# Patient Record
Sex: FEMALE | Race: BLACK OR AFRICAN AMERICAN | NOT HISPANIC OR LATINO | Employment: OTHER | URBAN - METROPOLITAN AREA
[De-identification: names, ages, dates, MRNs, and addresses within clinical notes are randomized per-mention and may not be internally consistent; named-entity substitution may affect disease eponyms.]

---

## 2019-02-28 ENCOUNTER — HOSPITAL ENCOUNTER (EMERGENCY)
Facility: HOSPITAL | Age: 44
Discharge: HOME | End: 2019-02-28
Attending: EMERGENCY MEDICINE
Payer: MEDICAID

## 2019-02-28 VITALS
TEMPERATURE: 99 F | WEIGHT: 166 LBS | HEIGHT: 65 IN | HEART RATE: 110 BPM | SYSTOLIC BLOOD PRESSURE: 122 MMHG | OXYGEN SATURATION: 97 % | DIASTOLIC BLOOD PRESSURE: 55 MMHG | BODY MASS INDEX: 27.66 KG/M2 | RESPIRATION RATE: 20 BRPM

## 2019-02-28 DIAGNOSIS — R68.89 FLU-LIKE SYMPTOMS: Primary | ICD-10-CM

## 2019-02-28 LAB
B-HCG UR QL: NEGATIVE
CTP QC/QA: YES
CTP QC/QA: YES
FLUAV AG NPH QL: NEGATIVE
FLUBV AG NPH QL: NEGATIVE
POCT GLUCOSE: 117 MG/DL (ref 70–110)

## 2019-02-28 PROCEDURE — 63600175 PHARM REV CODE 636 W HCPCS: Performed by: PHYSICIAN ASSISTANT

## 2019-02-28 PROCEDURE — 82962 GLUCOSE BLOOD TEST: CPT

## 2019-02-28 PROCEDURE — 96372 THER/PROPH/DIAG INJ SC/IM: CPT

## 2019-02-28 PROCEDURE — 99284 EMERGENCY DEPT VISIT MOD MDM: CPT | Mod: 25

## 2019-02-28 PROCEDURE — 25000003 PHARM REV CODE 250: Performed by: PHYSICIAN ASSISTANT

## 2019-02-28 PROCEDURE — 81025 URINE PREGNANCY TEST: CPT | Performed by: PHYSICIAN ASSISTANT

## 2019-02-28 RX ORDER — ONDANSETRON 4 MG/1
4 TABLET, ORALLY DISINTEGRATING ORAL
Status: COMPLETED | OUTPATIENT
Start: 2019-02-28 | End: 2019-02-28

## 2019-02-28 RX ORDER — BENZONATATE 100 MG/1
100 CAPSULE ORAL 3 TIMES DAILY PRN
Qty: 15 CAPSULE | Refills: 0 | Status: SHIPPED | OUTPATIENT
Start: 2019-02-28 | End: 2019-03-10

## 2019-02-28 RX ORDER — ONDANSETRON 4 MG/1
4 TABLET, FILM COATED ORAL EVERY 8 HOURS PRN
Qty: 12 TABLET | Refills: 0 | Status: SHIPPED | OUTPATIENT
Start: 2019-02-28

## 2019-02-28 RX ORDER — ACETAMINOPHEN 500 MG
1000 TABLET ORAL
Status: COMPLETED | OUTPATIENT
Start: 2019-02-28 | End: 2019-02-28

## 2019-02-28 RX ORDER — OSELTAMIVIR PHOSPHATE 75 MG/1
75 CAPSULE ORAL
COMMUNITY

## 2019-02-28 RX ORDER — KETOROLAC TROMETHAMINE 30 MG/ML
15 INJECTION, SOLUTION INTRAMUSCULAR; INTRAVENOUS
Status: COMPLETED | OUTPATIENT
Start: 2019-02-28 | End: 2019-02-28

## 2019-02-28 RX ADMIN — ONDANSETRON 4 MG: 4 TABLET, ORALLY DISINTEGRATING ORAL at 08:02

## 2019-02-28 RX ADMIN — KETOROLAC TROMETHAMINE 15 MG: 30 INJECTION, SOLUTION INTRAMUSCULAR at 09:02

## 2019-02-28 RX ADMIN — ACETAMINOPHEN 1000 MG: 500 TABLET, FILM COATED ORAL at 08:02

## 2019-03-01 NOTE — ED TRIAGE NOTES
"Pt c/o nonproductive cough, generalized body aches, fever, chills, N/V, sore throat since last night. Also reports "My stomach is sore from coughing". Pain is 10/10. Pt took robitussin and ibuprofen at 1200. Pt took osteltamivir earlier tonight but vomited it back up (first attempted dose)  "

## 2019-03-01 NOTE — ED PROVIDER NOTES
"Encounter Date: 2019  SORT:   42 y/o female with history of HTN, DM presenting for evaluation of 1 day history myalgias, vomiting x2, chills, dry cough, HA. Abdominal pain/soreness only with coughing. Denies fever,   diarrhea, urinary symptoms, nasal congestion, rhinorrhea. Sick contacts diagnosed with flu. Took 1 Tamiflu but vomited after. Initial orders placed. KASH Hermosillo PA-C       History     Chief Complaint   Patient presents with    Fatigue     denies fever; reports n/v, chills, and generalized body aches since yesterday; reports "I think I have the flu"; has also been coughing     Chief Complaint:  Flu-like symptoms  History of  Present Illness: History obtained from patient. This 43 y.o. female who has past medical history of gestational diabetes and hypertension presents to the ED complaining of flu-like symptoms that began last night.  She reports associated subjective fever, chills, decreased appetite, sore throat, nonproductive cough, fatigue and generalized body aches.  She reports multiple sick contacts with who have been diagnosed with flu.  She states she was prescribed Tamiflu and took the 1st dose today.  However, after taking the 1st does she began feeling nauseated and vomited twice.  She reports generalized abdominal pain that she describes as a sharp sensation only when she coughs.  She denies diarrhea, chest pain, shortness of breath, congestion, rhinorrhea, urinary symptoms, dizziness.          Review of patient's allergies indicates:  No Known Allergies  Past Medical History:   Diagnosis Date    Diabetes mellitus     gestational, diet controlled    Herpes     Hypertension      Past Surgical History:   Procedure Laterality Date     SECTION      x3     SECTION N/A 2014    [facility] at [facility]    DELIVERY-CEASAREAN SECTION Bilateral 2015    Performed by Shobha Cohen MD at [facility] L[facility]D OR    UMBILICAL HERNIA REPAIR   "     Family History   Problem Relation Age of Onset    Diabetes Other     Hypertension Other     Breast cancer Maternal Aunt     Breast cancer Paternal Aunt     Breast cancer Paternal Aunt     Colon cancer Neg Hx     Ovarian cancer Neg Hx      Social History     Tobacco Use    Smoking status: Never Smoker    Smokeless tobacco: Never Used   Substance Use Topics    Alcohol use: No    Drug use: No     Review of Systems   Constitutional: Positive for appetite change, chills, fatigue and fever.   HENT: Positive for sore throat. Negative for congestion and rhinorrhea.    Eyes: Negative for pain.   Respiratory: Positive for cough. Negative for shortness of breath.    Cardiovascular: Negative for chest pain.   Gastrointestinal: Positive for nausea and vomiting. Negative for abdominal pain and diarrhea.   Genitourinary: Negative for dysuria, frequency and hematuria.   Musculoskeletal: Negative for back pain.   Skin: Negative for rash.   Neurological: Negative for dizziness, weakness and headaches.       Physical Exam     Initial Vitals [02/28/19 2019]   BP Pulse Resp Temp SpO2   (!) 117/56 (!) 117 18 100.1 °F (37.8 °C) 99 %      MAP       --         Physical Exam    Nursing note and vitals reviewed.  Constitutional: She appears well-developed and well-nourished.  Non-toxic appearance. She does not have a sickly appearance. She does not appear ill. No distress.   HENT:   Head: Normocephalic and atraumatic.   Right Ear: Tympanic membrane normal.   Left Ear: Tympanic membrane normal.   Nose: Nose normal.   Mouth/Throat: Uvula is midline and mucous membranes are normal. Posterior oropharyngeal erythema present.   Eyes: EOM are normal. Pupils are equal, round, and reactive to light.   Neck: Normal range of motion. Neck supple.   Cardiovascular: Normal rate, regular rhythm and normal heart sounds. Exam reveals no gallop and no friction rub.    No murmur heard.  Pulmonary/Chest: Breath sounds normal. No respiratory  distress. She has no wheezes. She has no rhonchi. She has no rales.   Abdominal: Soft. Bowel sounds are normal. There is no tenderness. There is no rebound and no guarding.   Musculoskeletal: Normal range of motion.   Neurological: She is alert and oriented to person, place, and time.   Skin: Skin is warm and dry.   Psychiatric: She has a normal mood and affect.         ED Course   Procedures  Labs Reviewed   POCT GLUCOSE - Abnormal; Notable for the following components:       Result Value    POCT Glucose 117 (*)     All other components within normal limits   POCT INFLUENZA A/B   POCT URINE PREGNANCY          Imaging Results    None          Medical Decision Making:   Clinical Tests:   Lab Tests: Ordered and Reviewed  ED Management:  This is an evaluation of a 43 y.o. female that presents to the Emergency Department for cough, subjective fever, chills and myalgias for 1 day. The patient is a non-toxic, afebrile, and well appearing female. On physical exam ears and pharynx are without evidence of infection. Appears well hydrated with moist mucus membranes. Neck soft and supple with no meningeal signs or cervical lymphadenopathy. Breath sounds are clear and equal bilaterally with no adventitious breath sounds, tachypnea or respiratory distress with room air pulse ox of 97% and no evidence of hypoxia.     Vital Signs Are Reassuring.  RESULTS:  Influenza negative. UPT negative. Glucose 117.    My overall impression is flu-like symptoms. I considered, but at this time, do not suspect OM, OE, strep pharyngitis, meningitis, pneumonia, or acute bacterial sinusitis.    ED Course:  Patient treated in the emergency department with Toradol, ibuprofen and Zofran with significant improvement of symptoms. Patient was able to tolerate p.o. in the ED.  D/C Meds:  Zofran and Tessalon Perles.  Patient encouraged to continue taking prescribed Tamiflu.  I encouraged adequate oral hydration with clear liquids for the 1st 24 hr.   Educated patient on antipyretic therapy at home.  The diagnosis, treatment plan, instructions for follow-up and reevaluation with primary care as well as ED return precautions were discussed and understanding was verbalized. All questions or concerns have been addressed.     This case was discussed with Dr. Corrales who is in agreement with my assessment and plan.                         Clinical Impression:       ICD-10-CM ICD-9-CM   1. Flu-like symptoms R68.89 780.99                                Migel Briceño PA-C  03/01/19 0148

## 2020-05-13 ENCOUNTER — HOSPITAL ENCOUNTER (EMERGENCY)
Facility: HOSPITAL | Age: 45
Discharge: HOME | End: 2020-05-13
Attending: EMERGENCY MEDICINE
Payer: MEDICAID

## 2020-05-13 VITALS
OXYGEN SATURATION: 98 % | SYSTOLIC BLOOD PRESSURE: 136 MMHG | HEART RATE: 72 BPM | DIASTOLIC BLOOD PRESSURE: 72 MMHG | RESPIRATION RATE: 18 BRPM | WEIGHT: 170 LBS | TEMPERATURE: 98 F | HEIGHT: 65 IN | BODY MASS INDEX: 28.32 KG/M2

## 2020-05-13 DIAGNOSIS — D64.9 ANEMIA, UNSPECIFIED TYPE: ICD-10-CM

## 2020-05-13 DIAGNOSIS — R07.9 CHEST PAIN: Primary | ICD-10-CM

## 2020-05-13 LAB
ALBUMIN SERPL BCP-MCNC: 4 G/DL (ref 3.5–5.2)
ALP SERPL-CCNC: 63 U/L (ref 55–135)
ALT SERPL W/O P-5'-P-CCNC: 10 U/L (ref 10–44)
ANION GAP SERPL CALC-SCNC: 9 MMOL/L (ref 8–16)
AST SERPL-CCNC: 13 U/L (ref 10–40)
B-HCG UR QL: NEGATIVE
BASOPHILS # BLD AUTO: 0.03 K/UL (ref 0–0.2)
BASOPHILS NFR BLD: 0.4 % (ref 0–1.9)
BILIRUB SERPL-MCNC: 0.1 MG/DL (ref 0.1–1)
BNP SERPL-MCNC: 16 PG/ML (ref 0–99)
BUN SERPL-MCNC: 16 MG/DL (ref 6–20)
CALCIUM SERPL-MCNC: 9 MG/DL (ref 8.7–10.5)
CHLORIDE SERPL-SCNC: 108 MMOL/L (ref 95–110)
CO2 SERPL-SCNC: 24 MMOL/L (ref 23–29)
CREAT SERPL-MCNC: 0.9 MG/DL (ref 0.5–1.4)
CTP QC/QA: YES
D DIMER PPP IA.FEU-MCNC: 0.32 MG/L FEU
DIFFERENTIAL METHOD: ABNORMAL
EOSINOPHIL # BLD AUTO: 0.1 K/UL (ref 0–0.5)
EOSINOPHIL NFR BLD: 0.7 % (ref 0–8)
ERYTHROCYTE [DISTWIDTH] IN BLOOD BY AUTOMATED COUNT: 16.2 % (ref 11.5–14.5)
EST. GFR  (AFRICAN AMERICAN): >60 ML/MIN/1.73 M^2
EST. GFR  (NON AFRICAN AMERICAN): >60 ML/MIN/1.73 M^2
GLUCOSE SERPL-MCNC: 123 MG/DL (ref 70–110)
HCT VFR BLD AUTO: 32.3 % (ref 37–48.5)
HGB BLD-MCNC: 9.7 G/DL (ref 12–16)
IMM GRANULOCYTES # BLD AUTO: 0.02 K/UL (ref 0–0.04)
IMM GRANULOCYTES NFR BLD AUTO: 0.2 % (ref 0–0.5)
LYMPHOCYTES # BLD AUTO: 2.3 K/UL (ref 1–4.8)
LYMPHOCYTES NFR BLD: 26.8 % (ref 18–48)
MCH RBC QN AUTO: 22.9 PG (ref 27–31)
MCHC RBC AUTO-ENTMCNC: 30 G/DL (ref 32–36)
MCV RBC AUTO: 76 FL (ref 82–98)
MONOCYTES # BLD AUTO: 0.6 K/UL (ref 0.3–1)
MONOCYTES NFR BLD: 6.7 % (ref 4–15)
NEUTROPHILS # BLD AUTO: 5.6 K/UL (ref 1.8–7.7)
NEUTROPHILS NFR BLD: 65.2 % (ref 38–73)
NRBC BLD-RTO: 0 /100 WBC
PLATELET # BLD AUTO: 298 K/UL (ref 150–350)
PMV BLD AUTO: 10.5 FL (ref 9.2–12.9)
POTASSIUM SERPL-SCNC: 3.3 MMOL/L (ref 3.5–5.1)
PROT SERPL-MCNC: 7.7 G/DL (ref 6–8.4)
RBC # BLD AUTO: 4.23 M/UL (ref 4–5.4)
SARS-COV-2 RDRP RESP QL NAA+PROBE: NEGATIVE
SODIUM SERPL-SCNC: 141 MMOL/L (ref 136–145)
TROPONIN I SERPL DL<=0.01 NG/ML-MCNC: <0.006 NG/ML (ref 0–0.03)
WBC # BLD AUTO: 8.52 K/UL (ref 3.9–12.7)

## 2020-05-13 PROCEDURE — 81025 URINE PREGNANCY TEST: CPT | Performed by: NURSE PRACTITIONER

## 2020-05-13 PROCEDURE — 25000003 PHARM REV CODE 250: Performed by: NURSE PRACTITIONER

## 2020-05-13 PROCEDURE — 99285 EMERGENCY DEPT VISIT HI MDM: CPT | Mod: 25

## 2020-05-13 PROCEDURE — 84484 ASSAY OF TROPONIN QUANT: CPT

## 2020-05-13 PROCEDURE — 85025 COMPLETE CBC W/AUTO DIFF WBC: CPT

## 2020-05-13 PROCEDURE — 85379 FIBRIN DEGRADATION QUANT: CPT

## 2020-05-13 PROCEDURE — 93005 ELECTROCARDIOGRAM TRACING: CPT

## 2020-05-13 PROCEDURE — 80053 COMPREHEN METABOLIC PANEL: CPT

## 2020-05-13 PROCEDURE — 83880 ASSAY OF NATRIURETIC PEPTIDE: CPT

## 2020-05-13 PROCEDURE — U0002 COVID-19 LAB TEST NON-CDC: HCPCS

## 2020-05-13 RX ORDER — ASPIRIN 325 MG
325 TABLET, DELAYED RELEASE (ENTERIC COATED) ORAL
Status: COMPLETED | OUTPATIENT
Start: 2020-05-13 | End: 2020-05-13

## 2020-05-13 RX ORDER — FERROUS SULFATE 325(65) MG
325 TABLET ORAL DAILY
Qty: 30 TABLET | Refills: 0 | Status: SHIPPED | OUTPATIENT
Start: 2020-05-13

## 2020-05-13 RX ORDER — IBUPROFEN 600 MG/1
600 TABLET ORAL EVERY 6 HOURS PRN
Qty: 20 TABLET | Refills: 0 | OUTPATIENT
Start: 2020-05-13 | End: 2022-09-12

## 2020-05-13 RX ORDER — LIDOCAINE HYDROCHLORIDE 20 MG/ML
5 SOLUTION OROPHARYNGEAL
Status: COMPLETED | OUTPATIENT
Start: 2020-05-13 | End: 2020-05-13

## 2020-05-13 RX ORDER — MAG HYDROX/ALUMINUM HYD/SIMETH 200-200-20
10 SUSPENSION, ORAL (FINAL DOSE FORM) ORAL
Status: COMPLETED | OUTPATIENT
Start: 2020-05-13 | End: 2020-05-13

## 2020-05-13 RX ADMIN — ALUMINUM HYDROXIDE, MAGNESIUM HYDROXIDE, SIMETHICONE 10 ML: 400; 400; 40 SUSPENSION ORAL at 09:05

## 2020-05-13 RX ADMIN — LIDOCAINE HYDROCHLORIDE 5 ML: 20 SOLUTION ORAL; TOPICAL at 09:05

## 2020-05-13 RX ADMIN — ASPIRIN 325 MG: 325 TABLET, DELAYED RELEASE ORAL at 09:05

## 2020-05-14 NOTE — ED PROVIDER NOTES
Encounter Date: 2020    SCRIBE #1 NOTE: I, Aimee Murrell, am scribing for, and in the presence of,  Gloria Welch NP. I have scribed the following portions of the note - Other sections scribed: LOBO OAKLEY.       History     Chief Complaint   Patient presents with    Chest Pain     Intermittent left side CP for the past few days that has been more constant today accompanied with SOB at times. Denies vomiting or cardiac hx.     44-year-old female presenting with one week history of intermittent left sided chest pain that has been constant today. Pain has a pressure sensation and has no radiation. She reports pain is worsened with laying flat and reports dyspnea on exertion and laying flat. Patient sleeps on one pillow. She denies fever, chills, cough, hemoptysis, leg swelling, nausea, vomiting, abdominal pain, or any further complaints. Denies being on birth control. She denies any recent long car rides or plane rides. She denies history of blood clots or cardiac history. She denies any family history of cardiac related deaths at a young age. She denies drinking caffeine or herbal supplements. She reports attempting treatment with Son Aspirin last week that helped. She denies history of similar symptoms before. No recent sick contacts. She denies alcohol abuse or illicit drug use.        The history is provided by the patient. No  was used.     Review of patient's allergies indicates:  No Known Allergies  Past Medical History:   Diagnosis Date    Diabetes mellitus     gestational, diet controlled    Herpes     Hypertension      Past Surgical History:   Procedure Laterality Date     SECTION      x3    UMBILICAL HERNIA REPAIR       Family History   Problem Relation Age of Onset    Diabetes Other     Hypertension Other     Breast cancer Maternal Aunt     Breast cancer Paternal Aunt     Breast cancer Paternal Aunt     Colon cancer Neg Hx     Ovarian cancer Neg Hx      Social  History     Tobacco Use    Smoking status: Never Smoker    Smokeless tobacco: Never Used   Substance Use Topics    Alcohol use: No     Comment: occ    Drug use: No     Review of Systems   Constitutional: Negative for chills and fever.   HENT: Negative for congestion and sore throat.    Eyes: Negative for visual disturbance.   Respiratory: Positive for shortness of breath. Negative for cough.         (-)hemoptysis   Cardiovascular: Positive for chest pain. Negative for leg swelling.   Gastrointestinal: Negative for abdominal pain, nausea and vomiting.   Genitourinary: Negative for dysuria and vaginal discharge.   Skin: Negative for rash.   Neurological: Negative for headaches.   Psychiatric/Behavioral: Negative for decreased concentration.       Physical Exam     Initial Vitals [05/13/20 2036]   BP Pulse Resp Temp SpO2   134/61 99 20 99.3 °F (37.4 °C) 100 %      MAP       --         Physical Exam    Constitutional: She appears well-developed and well-nourished. She is not diaphoretic. No distress.   HENT:   Head: Normocephalic and atraumatic.   Neck: Normal range of motion.   Cardiovascular: Normal rate, regular rhythm, normal heart sounds and intact distal pulses. Exam reveals no gallop and no friction rub.    No murmur heard.  Pulmonary/Chest: Effort normal and breath sounds normal. No respiratory distress.   Speaking in full and clear sentences without dyspnea or pause.  No tachypnea.   Abdominal: Soft. Bowel sounds are normal. There is no tenderness.   Musculoskeletal: Normal range of motion.   No pretibial edema.   Neurological: She is alert and oriented to person, place, and time.   Skin: Skin is warm and dry.   Psychiatric: She has a normal mood and affect. Her behavior is normal.         ED Course   Procedures  Labs Reviewed   CBC W/ AUTO DIFFERENTIAL - Abnormal; Notable for the following components:       Result Value    Hemoglobin 9.7 (*)     Hematocrit 32.3 (*)     Mean Corpuscular Volume 76 (*)      Mean Corpuscular Hemoglobin 22.9 (*)     Mean Corpuscular Hemoglobin Conc 30.0 (*)     RDW 16.2 (*)     All other components within normal limits   COMPREHENSIVE METABOLIC PANEL - Abnormal; Notable for the following components:    Potassium 3.3 (*)     Glucose 123 (*)     All other components within normal limits   D DIMER, QUANTITATIVE   TROPONIN I   B-TYPE NATRIURETIC PEPTIDE   SARS-COV-2 RNA AMPLIFICATION, QUAL    Narrative:     What symptom criteria does the patient meet?->Shortness of  breath or difficulty breathing   POCT URINE PREGNANCY     EKG Readings: (Independently Interpreted)   Initial Reading: No STEMI. Rhythm: Sinus Tachycardia. Heart Rate: 101. Ectopy: No Ectopy. Conduction: Normal. T Waves: Normal.       Imaging Results          X-Ray Chest AP Portable (Final result)  Result time 05/13/20 21:49:37    Final result by Maria C Pryor MD (05/13/20 21:49:37)                 Impression:      No acute intrathoracic abnormality detected.      Electronically signed by: Maria C Pryor  Date:    05/13/2020  Time:    21:49             Narrative:    EXAMINATION:  AP PORTABLE CHEST    CLINICAL HISTORY:  Chest Pain;    TECHNIQUE:  AP portable chest radiograph was submitted.    COMPARISON:  01/04/2011    FINDINGS:  AP portable chest radiograph demonstrates a cardiac silhouette within normal limits.  There is no focal consolidation, pneumothorax, or pleural effusion. Multiple overlying cardiac leads are present.                                 Medical Decision Making:   ED Management:  44 year old patient with hx of hypertension presenting secondary to chest pain. Patient is at lower risk of ACS due to risk factors and HPI with a heart score of 1. Patient has received an aspirin. EKG was reassuring and chest xray showed nothing acute. Most likely musculoskeletal vs GI.    [email or web address]    Also considered but less likely:   PE: normal rate, no recent  immobilization/surgery/travel/family history.  D-dimer negative.  Pneumonia: chest xray negative. No fever. No cough and lungs non consistent with pna  Tamponade: unlikely due to chest xray and ekg  STEMI: No STEMI on ekg  Dissection: equal pulses bilaterally and no ripping chest pain to the back  Esophageal rupture: no dysphagia or vomiting and chest xray negative for mediastinal air  Arrhythmia: no arrhythmia on ekg  CHF: no fluid overload on Cxr and physical exam.  BNP negative.  Pneumothorax: bilateral breath sounds and no signs of pneumothorax on chest xray     Patient felt improved with interventions and has a lower risk of impending cardiac failure to the heart score of 1. Patient was discharged with follow up with PCP. Return precautions given, patient understands and agrees with plan. All questions answered.      Case was discussed with my attending physician who agrees my plan of care.    Additional MDM:   Heart Score:    History:          Slightly suspicious.  ECG:             Normal  Age:               Less than 45 years  Risk factors: 1-2 risk factors  Troponin:       Less than or equal to normal limit  Final Score: 1             Scribe Attestation:   Scribe #1: I performed the above scribed service and the documentation accurately describes the services I performed. I attest to the accuracy of the note.    Attending Attestation:     Physician Attestation Statement for NP/PA:   I discussed this assessment and plan of this patient with the NP/PA, but I did not personally examine the patient. The face to face encounter was performed by the NP/PA.                                Clinical Impression:       ICD-10-CM ICD-9-CM   1. Chest pain R07.9 786.50   2. Anemia, unspecified type D64.9 285.9         Disposition:   Disposition: Discharged  Condition: Stable     ED Disposition Condition    Discharge Stable        ED Prescriptions     Medication Sig Dispense Start Date End Date Auth. Provider    ferrous  sulfate (FEOSOL) 325 mg (65 mg iron) Tab tablet Take 1 tablet (325 mg total) by mouth once daily. 30 tablet 5/13/2020  Gloria Welch NP    ibuprofen (ADVIL,MOTRIN) 600 MG tablet Take 1 tablet (600 mg total) by mouth every 6 (six) hours as needed for Pain. 20 tablet 5/13/2020  Gloria Welch NP    GI cocktail (mylanta 30 mL, lidocaine 2 % viscous 10 mL, dicyclomine 10 mL) 50 mL Take 10 mLs by mouth 2 (two) times daily as needed (indigestion). 100 mL 5/13/2020  Gloria Welch NP        Follow-up Information     Follow up With Specialties Details Why Contact Info    Marilu Shaw MD Internal Medicine Schedule an appointment as soon as possible for a visit  For follow-up [address]  [facility] [address]  [phone]      [facility]  Schedule an appointment as soon as possible for a visit in 1 day For follow-up if you do not have a primary care doctor [address] [place] [address]  [phone]      [facility] Emergency Medicine Go to  If symptoms worsen [address]  [facility] [address] [phone]                      I, JONO Welch, personally performed the services described in this documentation. All medical record entries made by the scribe were at my direction and in my presence. I have reviewed the chart and agree that the record reflects my personal performance and is accurate and complete.               Gloria Welch NP  05/13/20 1161       Kg Carolina MD  05/13/20 3094

## 2020-05-14 NOTE — DISCHARGE INSTRUCTIONS

## 2020-05-14 NOTE — ED NOTES
"Pt c/o L upper chest pain x 2 wks, worse w/ lying down. Pt denies trauma. Pt is A & O x 3, states "+" episodic SOB, denies fever, chills, cough and N/V/D. Skin is warm, dry and pink. VSS. SAVANNA x 3mm. BBS- CTA. Chest pain is not reproducable, not affected by deep inspiration and not affected by palpation. Abd- SNT. PSM x 4 exts. Pt is connected to the pulse ox, B/P cuff and EKG monitor. Bed is locked and in the low position w/ the side rails up and locked for pt safety. Call bell @ the BS. Will continue to monitor closely.  "

## 2020-05-14 NOTE — ED NOTES
Pt states she is starting to feel better. Pt denies respiratory distress. VSS. Will continue to monitor closely.

## 2020-07-21 ENCOUNTER — HOSPITAL ENCOUNTER (EMERGENCY)
Facility: HOSPITAL | Age: 45
Discharge: HOME | End: 2020-07-21
Attending: EMERGENCY MEDICINE
Payer: MEDICAID

## 2020-07-21 VITALS
DIASTOLIC BLOOD PRESSURE: 80 MMHG | HEART RATE: 77 BPM | BODY MASS INDEX: 28.32 KG/M2 | HEIGHT: 65 IN | RESPIRATION RATE: 16 BRPM | WEIGHT: 170 LBS | TEMPERATURE: 98 F | OXYGEN SATURATION: 100 % | SYSTOLIC BLOOD PRESSURE: 126 MMHG

## 2020-07-21 DIAGNOSIS — R51.9 NONINTRACTABLE HEADACHE, UNSPECIFIED CHRONICITY PATTERN, UNSPECIFIED HEADACHE TYPE: Primary | ICD-10-CM

## 2020-07-21 LAB
B-HCG UR QL: NEGATIVE
CTP QC/QA: YES

## 2020-07-21 PROCEDURE — 25000003 PHARM REV CODE 250: Performed by: PHYSICIAN ASSISTANT

## 2020-07-21 PROCEDURE — 81025 URINE PREGNANCY TEST: CPT | Performed by: PHYSICIAN ASSISTANT

## 2020-07-21 PROCEDURE — 63600175 PHARM REV CODE 636 W HCPCS: Performed by: PHYSICIAN ASSISTANT

## 2020-07-21 PROCEDURE — 99284 EMERGENCY DEPT VISIT MOD MDM: CPT | Mod: 25

## 2020-07-21 PROCEDURE — 96372 THER/PROPH/DIAG INJ SC/IM: CPT

## 2020-07-21 RX ORDER — BUTALBITAL, ACETAMINOPHEN AND CAFFEINE 50; 325; 40 MG/1; MG/1; MG/1
1 TABLET ORAL EVERY 6 HOURS PRN
Qty: 6 TABLET | Refills: 0 | Status: SHIPPED | OUTPATIENT
Start: 2020-07-21

## 2020-07-21 RX ORDER — KETOROLAC TROMETHAMINE 30 MG/ML
30 INJECTION, SOLUTION INTRAMUSCULAR; INTRAVENOUS
Status: COMPLETED | OUTPATIENT
Start: 2020-07-21 | End: 2020-07-21

## 2020-07-21 RX ORDER — BUTALBITAL, ACETAMINOPHEN AND CAFFEINE 50; 325; 40 MG/1; MG/1; MG/1
1 TABLET ORAL
Status: COMPLETED | OUTPATIENT
Start: 2020-07-21 | End: 2020-07-21

## 2020-07-21 RX ADMIN — KETOROLAC TROMETHAMINE 30 MG: 30 INJECTION, SOLUTION INTRAMUSCULAR at 08:07

## 2020-07-21 RX ADMIN — BUTALBITAL, ACETAMINOPHEN AND CAFFEINE 1 TABLET: 50; 325; 40 TABLET ORAL at 08:07

## 2020-07-21 NOTE — PROVIDER PROGRESS NOTES - EMERGENCY DEPT.
Emergency Department TeleTRIAGE Encounter Note      CHIEF COMPLAINT    Chief Complaint   Patient presents with    Headache     posterior headache x 2 days, no nausea, no vomiting       VITAL SIGNS   Initial Vitals [07/21/20 1755]   BP Pulse Resp Temp SpO2   (!) 158/73 100 20 98 °F (36.7 °C) 100 %      MAP       --            ALLERGIES    Review of patient's allergies indicates:  No Known Allergies    PROVIDER TRIAGE NOTE  This is a teletriage evaluation of a 44 y.o. female presenting to the ED with c/o occipital and bitemporal headache x2 days.  She has had a similar headache before.  She denies photophobia or fever. Initial orders will be placed and care will be transferred to an alternate provider when patient is roomed for a full evaluation. Any additional orders and the final disposition will be determined by that provider.         ORDERS  Labs Reviewed   POCT URINE PREGNANCY       ED Orders (720h ago, onward)    Start Ordered     Status Ordering Provider    07/21/20 1832 07/21/20 1831  POCT urine pregnancy  Once      Ordered VERONICA ARMSTRONG            Virtual Visit Note: The provider triage portion of this emergency department evaluation and documentation was performed via [email or web address], a HIPAA-compliant telemedicine application, in concert with a tele-presenter in the room. A face to face patient evaluation with one of my colleagues will occur once the patient is placed in an emergency department room.      DISCLAIMER: This note was prepared with [employer] voice recognition transcription software. Garbled syntax, mangled pronouns, and other bizarre constructions may be attributed to that software system.

## 2020-07-22 NOTE — ED NOTES
44 y.o. female to ED with c.o. occipital headache, patient describes the pain as sharp stabbing - no relief from OTC medications. Patient denies blurred vision, dizziness, photophobia.

## 2020-07-22 NOTE — ED PROVIDER NOTES
Encounter Date: 2020    SCRIBE #1 NOTE: IFilipe, am scribing for, and in the presence of,  Migel Briceño PA-C. I have scribed the following portions of the note - Other sections scribed: HPI/ROS/PE.       History     Chief Complaint   Patient presents with    Headache     posterior headache x 2 days, no nausea, no vomiting     Pt seen by provider at 19:57    This 44 y.o. female with a medical history of gestational DM and HTN presents to the ED for an emergent evaluation of a severe (10/10), occipital headache that began yesterday. Pt states headache gradually worsened since onset. Pt has been attempting tx with 600mg Ibuprofen and Tylenol. She last took Ibuprofen at noon today with temporary relief. Pt reports having a similar headache awhile ago. No known allergies to medications. No tobacco, EtOH, or IV drug use. Of note, pt reports having a hysterectomy 5 weeks ago and had a urinary stent removed yesterday by Dr. Yang at [facility]. Otherwise, pt denies fever, chills, n/v, visual disturbances, neck stiffness, dizziness, weakness, numbness, speech difficulty, gait problem, and any other associated symptoms.    The history is provided by the patient. No  was used.     Review of patient's allergies indicates:  No Known Allergies  Past Medical History:   Diagnosis Date    Diabetes mellitus     gestational, diet controlled    Herpes     Hypertension      Past Surgical History:   Procedure Laterality Date     SECTION      x3    UMBILICAL HERNIA REPAIR       Family History   Problem Relation Age of Onset    Diabetes Other     Hypertension Other     Breast cancer Maternal Aunt     Breast cancer Paternal Aunt     Breast cancer Paternal Aunt     Colon cancer Neg Hx     Ovarian cancer Neg Hx      Social History     Tobacco Use    Smoking status: Never Smoker    Smokeless tobacco: Never Used   Substance Use Topics    Alcohol use: No     Comment:  occ    Drug use: No     Review of Systems   Constitutional: Negative for chills and fever.   HENT: Negative for congestion, rhinorrhea and sore throat.    Eyes: Negative for visual disturbance.   Respiratory: Negative for cough and shortness of breath.    Cardiovascular: Negative for chest pain.   Gastrointestinal: Negative for abdominal pain, diarrhea, nausea and vomiting.   Genitourinary: Negative for difficulty urinating and dysuria.   Musculoskeletal: Negative for gait problem, myalgias and neck stiffness.   Skin: Negative for rash.   Neurological: Positive for headaches. Negative for dizziness, speech difficulty, weakness, light-headedness and numbness.       Physical Exam     Initial Vitals [07/21/20 1755]   BP Pulse Resp Temp SpO2   (!) 158/73 100 20 98 °F (36.7 °C) 100 %      MAP       --         Physical Exam    Nursing note and vitals reviewed.  Constitutional: She appears well-developed and well-nourished. No distress.   HENT:   Head: Normocephalic and atraumatic.   Right Ear: Tympanic membrane normal.   Left Ear: Tympanic membrane normal.   Nose: Nose normal.   Mouth/Throat: Uvula is midline, oropharynx is clear and moist and mucous membranes are normal.   Eyes: EOM are normal. Pupils are equal, round, and reactive to light.   Neck: Trachea normal, normal range of motion, full passive range of motion without pain and phonation normal. Neck supple. No stridor present. No spinous process tenderness and no muscular tenderness present. Normal range of motion present. No neck rigidity. No Brudzinski's sign and no Kernig's sign noted.   Cardiovascular: Normal rate, regular rhythm and normal heart sounds. Exam reveals no gallop and no friction rub.    No murmur heard.  Pulmonary/Chest: Effort normal and breath sounds normal. No respiratory distress. She has no wheezes. She has no rhonchi. She has no rales.   Abdominal: Soft. Bowel sounds are normal. There is no abdominal tenderness. There is no rebound and no  guarding.   Musculoskeletal: Normal range of motion.   Neurological: She is alert and oriented to person, place, and time. She has normal strength. No cranial nerve deficit or sensory deficit. She displays a negative Romberg sign. Coordination and gait normal. GCS score is 15. GCS eye subscore is 4. GCS verbal subscore is 5. GCS motor subscore is 6.   Normal rapid alternating movements.  Normal heel-to-shin.  Normal finger-nose.   Skin: Skin is warm and dry. Capillary refill takes less than 2 seconds.   Psychiatric: She has a normal mood and affect.         ED Course   Procedures  Labs Reviewed   POCT URINE PREGNANCY          Imaging Results    None          Medical Decision Making:   ED Management:  This is an evaluation of a 44 y.o. female that presents to the Emergency Department for headache. The patient is a non-toxic, afebrile, and well appearing female. On physical exam: she is AAO, has no focal weakness or neurological deficits. Has full ROM of neck with no nuchal rigidity or meningeal signs.  There is no staggering or ataxic gait, vomiting, double vision, visual loss, slurred speech, numbness of the face or body, weakness, clumsiness, or incoordination. No external signs of head injury or trauma. No tenderness or induration over the temples. she reports NO: history of malignancy, syncope, current nor recent pregnancy, or seizures associated with this headache. she is not immunocompromised.     Vital Signs are Reassuring. RESULTS:  UPT negative.    Given the above findings, my overall impression is tension headache. Differential Diagnosis included but was not limited to: SAH, epidural hematoma, subdural hematoma, CVA, temporal arteritis, migraine headache, meningitis acute angle glaucoma    ED Treatments:  Patient treated the ED with Toradol and Fioricet with improvement of headache. DC Meds:  Fioricet. The diagnosis, treatment plan, instructions for follow-up and reevaluation with Neurology as well as ED  return precautions have been discussed with the patient and the patient has verbalized an understanding of the information. All questions or concerns have been addressed.               Scribe Attestation:   Scribe #1: I performed the above scribed service and the documentation accurately describes the services I performed. I attest to the accuracy of the note.                          Clinical Impression:       ICD-10-CM ICD-9-CM   1. Nonintractable headache, unspecified chronicity pattern, unspecified headache type  R51 784.0           Scribe Attestation: I, Migel Briceño , personally performed the services described in this documentation. All medical record entries made by the scribe were at my direction and in my presence. I have reviewed the chart and agree that the record reflects my personal performance and is accurate and complete.   ED Disposition Condition    Discharge Stable        ED Prescriptions     Medication Sig Dispense Start Date End Date Auth. Provider    butalbital-acetaminophen-caffeine -40 mg (FIORICET, ESGIC) -40 mg per tablet Take 1 tablet by mouth every 6 (six) hours as needed for Pain. 6 tablet 7/21/2020  Migel Briceño PA-C        Follow-up Information     Follow up With Specialties Details Why Contact Info    Hammad Turner MD Neurology   [address]  [facility] [address]  [phone]      [facility] Emergency Medicine Go in 1 day If symptoms worsen [address]  [facility] [address] [phone]

## 2022-09-12 ENCOUNTER — HOSPITAL ENCOUNTER (EMERGENCY)
Facility: HOSPITAL | Age: 47
Discharge: HOME | End: 2022-09-12
Attending: EMERGENCY MEDICINE
Payer: MEDICAID

## 2022-09-12 VITALS
HEIGHT: 65 IN | WEIGHT: 170 LBS | HEART RATE: 66 BPM | OXYGEN SATURATION: 98 % | DIASTOLIC BLOOD PRESSURE: 68 MMHG | RESPIRATION RATE: 16 BRPM | BODY MASS INDEX: 28.32 KG/M2 | TEMPERATURE: 99 F | SYSTOLIC BLOOD PRESSURE: 140 MMHG

## 2022-09-12 DIAGNOSIS — R10.32 LLQ PAIN: ICD-10-CM

## 2022-09-12 DIAGNOSIS — N83.209 HEMORRHAGIC CYST OF OVARY: Primary | ICD-10-CM

## 2022-09-12 LAB
ALBUMIN SERPL BCP-MCNC: 3.9 G/DL (ref 3.5–5.2)
ALP SERPL-CCNC: 64 U/L (ref 55–135)
ALT SERPL W/O P-5'-P-CCNC: 10 U/L (ref 10–44)
ANION GAP SERPL CALC-SCNC: 8 MMOL/L (ref 8–16)
AST SERPL-CCNC: 13 U/L (ref 10–40)
B-HCG UR QL: NEGATIVE
BACTERIA GENITAL QL WET PREP: ABNORMAL
BASOPHILS # BLD AUTO: 0.03 K/UL (ref 0–0.2)
BASOPHILS NFR BLD: 0.4 % (ref 0–1.9)
BILIRUB SERPL-MCNC: 0.5 MG/DL (ref 0.1–1)
BILIRUB UR QL STRIP: NEGATIVE
BUN SERPL-MCNC: 14 MG/DL (ref 6–20)
CALCIUM SERPL-MCNC: 9.4 MG/DL (ref 8.7–10.5)
CHLORIDE SERPL-SCNC: 106 MMOL/L (ref 95–110)
CLARITY UR: CLEAR
CLUE CELLS VAG QL WET PREP: ABNORMAL
CO2 SERPL-SCNC: 27 MMOL/L (ref 23–29)
COLOR UR: YELLOW
CREAT SERPL-MCNC: 0.9 MG/DL (ref 0.5–1.4)
CTP QC/QA: YES
DIFFERENTIAL METHOD: ABNORMAL
EOSINOPHIL # BLD AUTO: 0.1 K/UL (ref 0–0.5)
EOSINOPHIL NFR BLD: 0.7 % (ref 0–8)
ERYTHROCYTE [DISTWIDTH] IN BLOOD BY AUTOMATED COUNT: 13.2 % (ref 11.5–14.5)
EST. GFR  (NO RACE VARIABLE): >60 ML/MIN/1.73 M^2
FILAMENT FUNGI VAG WET PREP-#/AREA: ABNORMAL
GLUCOSE SERPL-MCNC: 99 MG/DL (ref 70–110)
GLUCOSE UR QL STRIP: NEGATIVE
HCT VFR BLD AUTO: 42 % (ref 37–48.5)
HGB BLD-MCNC: 13.8 G/DL (ref 12–16)
HGB UR QL STRIP: NEGATIVE
IMM GRANULOCYTES # BLD AUTO: 0.02 K/UL (ref 0–0.04)
IMM GRANULOCYTES NFR BLD AUTO: 0.2 % (ref 0–0.5)
KETONES UR QL STRIP: NEGATIVE
LEUKOCYTE ESTERASE UR QL STRIP: NEGATIVE
LIPASE SERPL-CCNC: 41 U/L (ref 4–60)
LYMPHOCYTES # BLD AUTO: 1.7 K/UL (ref 1–4.8)
LYMPHOCYTES NFR BLD: 20.2 % (ref 18–48)
MCH RBC QN AUTO: 27 PG (ref 27–31)
MCHC RBC AUTO-ENTMCNC: 32.9 G/DL (ref 32–36)
MCV RBC AUTO: 82 FL (ref 82–98)
MONOCYTES # BLD AUTO: 0.3 K/UL (ref 0.3–1)
MONOCYTES NFR BLD: 4.2 % (ref 4–15)
NEUTROPHILS # BLD AUTO: 6.1 K/UL (ref 1.8–7.7)
NEUTROPHILS NFR BLD: 74.3 % (ref 38–73)
NITRITE UR QL STRIP: NEGATIVE
NRBC BLD-RTO: 0 /100 WBC
PH UR STRIP: 6 [PH] (ref 5–8)
PLATELET # BLD AUTO: 242 K/UL (ref 150–450)
PMV BLD AUTO: 10.2 FL (ref 9.2–12.9)
POTASSIUM SERPL-SCNC: 3.8 MMOL/L (ref 3.5–5.1)
PROT SERPL-MCNC: 7.4 G/DL (ref 6–8.4)
PROT UR QL STRIP: NEGATIVE
RBC # BLD AUTO: 5.11 M/UL (ref 4–5.4)
SODIUM SERPL-SCNC: 141 MMOL/L (ref 136–145)
SP GR UR STRIP: 1.02 (ref 1–1.03)
SPECIMEN SOURCE: ABNORMAL
T VAGINALIS GENITAL QL WET PREP: ABNORMAL
URN SPEC COLLECT METH UR: NORMAL
UROBILINOGEN UR STRIP-ACNC: NEGATIVE EU/DL
WBC # BLD AUTO: 8.17 K/UL (ref 3.9–12.7)
WBC #/AREA VAG WET PREP: ABNORMAL
YEAST GENITAL QL WET PREP: ABNORMAL

## 2022-09-12 PROCEDURE — 25000003 PHARM REV CODE 250: Performed by: PHYSICIAN ASSISTANT

## 2022-09-12 PROCEDURE — 80053 COMPREHEN METABOLIC PANEL: CPT | Performed by: PHYSICIAN ASSISTANT

## 2022-09-12 PROCEDURE — 87591 N.GONORRHOEAE DNA AMP PROB: CPT | Performed by: PHYSICIAN ASSISTANT

## 2022-09-12 PROCEDURE — 25500020 PHARM REV CODE 255: Performed by: PHYSICIAN ASSISTANT

## 2022-09-12 PROCEDURE — 83690 ASSAY OF LIPASE: CPT | Performed by: PHYSICIAN ASSISTANT

## 2022-09-12 PROCEDURE — 87210 SMEAR WET MOUNT SALINE/INK: CPT | Performed by: PHYSICIAN ASSISTANT

## 2022-09-12 PROCEDURE — 87491 CHLMYD TRACH DNA AMP PROBE: CPT | Performed by: PHYSICIAN ASSISTANT

## 2022-09-12 PROCEDURE — 96374 THER/PROPH/DIAG INJ IV PUSH: CPT | Mod: 59

## 2022-09-12 PROCEDURE — 99285 EMERGENCY DEPT VISIT HI MDM: CPT | Mod: 25

## 2022-09-12 PROCEDURE — 85025 COMPLETE CBC W/AUTO DIFF WBC: CPT | Performed by: PHYSICIAN ASSISTANT

## 2022-09-12 PROCEDURE — 96361 HYDRATE IV INFUSION ADD-ON: CPT

## 2022-09-12 PROCEDURE — 81003 URINALYSIS AUTO W/O SCOPE: CPT | Performed by: PHYSICIAN ASSISTANT

## 2022-09-12 PROCEDURE — 96375 TX/PRO/DX INJ NEW DRUG ADDON: CPT

## 2022-09-12 PROCEDURE — 96376 TX/PRO/DX INJ SAME DRUG ADON: CPT

## 2022-09-12 PROCEDURE — 63600175 PHARM REV CODE 636 W HCPCS: Performed by: PHYSICIAN ASSISTANT

## 2022-09-12 RX ORDER — KETOROLAC TROMETHAMINE 30 MG/ML
15 INJECTION, SOLUTION INTRAMUSCULAR; INTRAVENOUS
Status: COMPLETED | OUTPATIENT
Start: 2022-09-12 | End: 2022-09-12

## 2022-09-12 RX ORDER — ACETAMINOPHEN 500 MG
500 TABLET ORAL EVERY 4 HOURS PRN
Qty: 20 TABLET | Refills: 0 | Status: SHIPPED | OUTPATIENT
Start: 2022-09-12 | End: 2022-09-17

## 2022-09-12 RX ORDER — MORPHINE SULFATE 4 MG/ML
2 INJECTION, SOLUTION INTRAMUSCULAR; INTRAVENOUS
Status: COMPLETED | OUTPATIENT
Start: 2022-09-12 | End: 2022-09-12

## 2022-09-12 RX ORDER — CYCLOBENZAPRINE HCL 10 MG
10 TABLET ORAL 3 TIMES DAILY PRN
Qty: 20 TABLET | Refills: 0 | Status: SHIPPED | OUTPATIENT
Start: 2022-09-12 | End: 2022-09-19

## 2022-09-12 RX ORDER — MORPHINE SULFATE 4 MG/ML
4 INJECTION, SOLUTION INTRAMUSCULAR; INTRAVENOUS
Status: COMPLETED | OUTPATIENT
Start: 2022-09-12 | End: 2022-09-12

## 2022-09-12 RX ORDER — IBUPROFEN 600 MG/1
600 TABLET ORAL EVERY 6 HOURS PRN
Qty: 20 TABLET | Refills: 0 | Status: SHIPPED | OUTPATIENT
Start: 2022-09-12 | End: 2022-09-17

## 2022-09-12 RX ADMIN — KETOROLAC TROMETHAMINE 15 MG: 30 INJECTION, SOLUTION INTRAMUSCULAR; INTRAVENOUS at 12:09

## 2022-09-12 RX ADMIN — MORPHINE SULFATE 4 MG: 4 INJECTION INTRAVENOUS at 12:09

## 2022-09-12 RX ADMIN — MORPHINE SULFATE 2 MG: 4 INJECTION INTRAVENOUS at 05:09

## 2022-09-12 RX ADMIN — SODIUM CHLORIDE 1000 ML: 0.9 INJECTION, SOLUTION INTRAVENOUS at 12:09

## 2022-09-12 RX ADMIN — IOHEXOL 75 ML: 350 INJECTION, SOLUTION INTRAVENOUS at 01:09

## 2022-09-12 NOTE — DISCHARGE INSTRUCTIONS

## 2022-09-12 NOTE — Clinical Note
"Zaida Benjaminpaula Lindsey was seen and treated in our emergency department on 9/12/2022.  She may return to work on 09/15/2022.       If you have any questions or concerns, please don't hesitate to call.      Lisa Hermosillo PA-C"

## 2022-09-13 NOTE — ED PROVIDER NOTES
Encounter Date: 2022       History     Chief Complaint   Patient presents with    Abdominal Pain     Pt states that she started to have lower abdominal pain today with no nausea or vomiting no constipation or diarrhea, pain is constant.      CC: Abdominal Pain    HPI:   47 y/o F with HTN, DM PSHx hysterectomy, hernia repair presenting for evaluation of 3 day history of 8/10 LLQ pain. No aggravating factors. Denies fever, chills, nausea, vomiting, diarrhea, constipation, dysuria, hematuria, urinary urgency or frequency.     The history is provided by the patient.   Review of patient's allergies indicates:  No Known Allergies  Past Medical History:   Diagnosis Date    Diabetes mellitus     gestational, diet controlled    Herpes     Hypertension      Past Surgical History:   Procedure Laterality Date     SECTION      x3    UMBILICAL HERNIA REPAIR       Family History   Problem Relation Age of Onset    Diabetes Other     Hypertension Other     Breast cancer Maternal Aunt     Breast cancer Paternal Aunt     Breast cancer Paternal Aunt     Colon cancer Neg Hx     Ovarian cancer Neg Hx      Social History     Tobacco Use    Smoking status: Never    Smokeless tobacco: Never   Substance Use Topics    Alcohol use: No     Comment: occ    Drug use: No     Review of Systems   Constitutional:  Negative for chills and fever.   HENT:  Negative for congestion, ear pain, nosebleeds, rhinorrhea, sore throat and trouble swallowing.    Eyes:  Negative for redness.   Respiratory:  Negative for cough, shortness of breath and stridor.    Cardiovascular:  Negative for chest pain.   Gastrointestinal:  Positive for abdominal pain. Negative for constipation, diarrhea, nausea and vomiting.   Genitourinary:  Negative for decreased urine volume, dysuria, frequency, hematuria and urgency.   Musculoskeletal:  Negative for back pain and neck pain.   Skin:  Negative for rash and wound.   Neurological:  Negative for dizziness, speech  difficulty, weakness, light-headedness, numbness and headaches.   Psychiatric/Behavioral:  Negative for confusion.      Physical Exam     Initial Vitals   BP Pulse Resp Temp SpO2   09/12/22 1125 09/12/22 1125 09/12/22 1125 09/12/22 1644 09/12/22 1125   128/72 74 18 98.5 °F (36.9 °C) 100 %      MAP       --                Physical Exam    Nursing note and vitals reviewed.  Constitutional: She appears well-developed and well-nourished. No distress.   HENT:   Head: Normocephalic.   Right Ear: External ear normal.   Left Ear: External ear normal.   Eyes: Conjunctivae are normal. Right eye exhibits no discharge. Left eye exhibits no discharge. No scleral icterus.   Neck: No tracheal deviation present.   Cardiovascular:  Normal rate and regular rhythm.     Exam reveals no gallop and no friction rub.       No murmur heard.  Pulmonary/Chest: No stridor. No respiratory distress. She has no wheezes. She has no rhonchi. She has no rales.   Abdominal: Abdomen is soft. She exhibits no distension. There is abdominal tenderness in the left lower quadrant.   No right CVA tenderness.  No left CVA tenderness. There is no rebound, no guarding, no tenderness at McBurney's point and negative Elliott's sign. negative Rovsing's sign  Musculoskeletal:         General: Normal range of motion.     Neurological: She is alert.   Skin: Skin is warm and dry. No rash noted. No erythema.   Psychiatric: She has a normal mood and affect. Her behavior is normal. Judgment and thought content normal.       ED Course   Procedures  Labs Reviewed   VAGINAL SCREEN - Abnormal; Notable for the following components:       Result Value    WBC - Vaginal Screen Rare (*)     Bacteria - Vaginal Screen Many (*)     All other components within normal limits    Narrative:     Release to patient->Immediate   CBC W/ AUTO DIFFERENTIAL - Abnormal; Notable for the following components:    Gran % 74.3 (*)     All other components within normal limits   C. TRACHOMATIS/N.  GONORRHOEAE BY AMP DNA   COMPREHENSIVE METABOLIC PANEL   LIPASE   URINALYSIS, REFLEX TO URINE CULTURE    Narrative:     Specimen Source->Urine          Imaging Results              US Pelvis Complete Non OB (Final result)  Result time 09/12/22 16:20:40   Procedure changed from US Transvaginal Non OB     Final result by Coy Christianson MD (09/12/22 16:20:40)                   Impression:      1. Hysterectomy.  2. Left ovarian 4 cm suspected hemorrhagic cyst, correlating with findings on recent CT.  Given the reported history of current left lower quadrant/pelvic pain, follow-up pelvic ultrasound in 6-12 weeks is recommended to ensure stability/resolution.      Electronically signed by: Coy Christianson MD  Date:    09/12/2022  Time:    16:20               Narrative:    EXAMINATION:  US PELVIS COMPLETE NON OB    CLINICAL HISTORY:  LLQ PAIN, CYST ON CT ; Left lower quadrant pain    TECHNIQUE:  Transabdominal only sonography of the pelvis was performed.    COMPARISON:  CT abdomen and pelvis earlier same day    FINDINGS:  Uterus:    Reportedly surgically absent.    Right ovary:    Size: 3.4 x 3 x 2.5 cm    Appearance: Normal    Vascular flow: Normal.    Left ovary:    Size: 5.2 x 4.2 x 4.2 cm    Appearance: Within the left ovary there is a 4 x 3.7 x 3.2 cm hypoechoic mass with internal somewhat lace-like septations and no significant vascularity suggestive of a hemorrhagic cyst, correlating with finding on recent CT.    Vascular Flow: Normal.    Free Fluid:    None.                                        CT Abdomen Pelvis With Contrast (Final result)  Result time 09/12/22 14:40:41      Final result by Coy Christianson MD (09/12/22 14:40:41)                   Impression:      1. No acute process seen within the left lower quadrant and no significant colonic diverticular disease.  2. Left adnexal 3.8 cm simple appearing cyst.  Further evaluation with pelvic ultrasound can be obtained given the current reported history of left  lower quadrant pain.  3. Pelvic small volume nonspecific free fluid, commonly physiologic in this age group.  4. Hepatic diffuse heterogeneous parenchymal attenuation more so throughout the right lobe, nonspecific.  This could be artifactual related to timing of contrast bolus and image acquisition versus underlying hepatic disease including hepatitis or fatty infiltration.  Infiltrative type neoplastic process not excluded on the basis of this examination.  Clinical correlation and with LFTs advised.  5. Left hepatic lobe 1.2 cm hypodense lesion not consistent with a simple cyst and remains indeterminate.  Several additional scattered subcentimeter hepatic parenchymal foci which are too small to characterize.  Further evaluation with elective/nonemergent CT or MRI of the abdomen with triple phase hepatic mass protocol can be obtained as warranted.  6. Anterior upper splenic several exophytic hypodense masses, the largest of which demonstrate simple cystic characteristics, while the others are too small to accurately characterize.  7. Additional findings as above.  This report was flagged in Epic as abnormal.  This report was flagged in Epic as containing an incidental finding.      Electronically signed by: Coy Christianson MD  Date:    09/12/2022  Time:    14:40               Narrative:    EXAMINATION:  CT ABDOMEN PELVIS WITH CONTRAST    CLINICAL HISTORY:  LLQ abdominal pain;    TECHNIQUE:  Low dose axial images, sagittal and coronal reformations were obtained from the lung bases to the pubic symphysis following the IV administration of 75 mL of Omnipaque 350 .  Oral contrast was not given.    COMPARISON:  Chest radiograph 05/13/2020 and 01/04/2011    FINDINGS:  Imaged lung bases show mild dependent atelectasis. Base of the heart is within normal limits.    Liver is normal in size with heterogeneous parenchymal attenuation, more so throughout the right lobe. There is a 1.2 cm hypodense lesion within the lateral left  lobe with average 43 Hounsfield units not consistent with a simple cyst.  Several scattered additional subcentimeter hypoattenuating parenchymal foci throughout each hepatic lobe which are too small to characterize. There is suspected diffuse nonspecific periportal edema throughout the liver.    No biliary ductal dilatation. Gallbladder, pancreas, stomach, duodenum and bilateral adrenal glands are within normal limits.    Spleen is normal in size. There are several mostly exophytic hypodense masses along the anterior peripheral aspect of the superior spleen. The largest measures 1.2 cm more laterally with average 14 Hounsfield units suggestive of simple fluid. The others are subcentimeter and therefore too small to adequately characterize.  Small accessory splenule anterior to the lower spleen.    Bilateral kidneys are normal in size, shape and location with relatively symmetric normal enhancement. No hydronephrosis or significant perinephric stranding. Ureters are nondilated. Urinary bladder is well distended without wall thickening. Uterus is not identified and likely surgically absent. No right adnexal mass seen. There is a 3.8 cm well-circumscribed simple appearing cyst at the left adnexa.  Moderate gaseous distension of the vaginal vault, nonspecific. A few scattered punctate pelvic phleboliths noted. There is small volume nonspecific free pelvic fluid.    No ascites, free air or lymphadenopathy. No significant atherosclerosis. No aortic aneurysm or dissection.    Appendix and terminal ileum are within normal limits.  No evidence of bowel obstruction or acute inflammation. No pneumatosis or portal venous gas.    Osseous structures show minimal degenerative change and otherwise appear intact.                                       Medications   ketorolac injection 15 mg (15 mg Intravenous Given 9/12/22 1255)   morphine injection 4 mg (4 mg Intravenous Given 9/12/22 1254)   sodium chloride 0.9% bolus 1,000 mL (0  mLs Intravenous Stopped 9/12/22 1345)   iohexoL (OMNIPAQUE 350) injection 75 mL (75 mLs Intravenous Given 9/12/22 1358)   morphine injection 2 mg (2 mg Intravenous Given 9/12/22 1713)     Medical Decision Making:   Clinical Tests:   Lab Tests: Ordered and Reviewed  Radiological Study: Ordered and Reviewed  ED Management:  46-year-old female presenting for left lower quadrant pain.  Past surgical history of hysterectomy.  Left lower quadrant tenderness palpation with no peritoneal signs.  Pelvic exam with thick white vaginal discharge.  Vaginal screen negative.  No adnexal tenderness or palpable masses.  Pain is improved after IV morphine and Toradol in the emergency department.  She is pain-free after medication.  Labs unremarkable.  No significant anemia or leukocytosis.  CMP without significant electrolyte abnormality renal insufficiency.  CT is negative for findings consistent with acute surgical abdomen.  Remarkable for incidental findings discussed and provided cough be to the pt. Infiltrative neoplastic process not excluded in the liver as well as exophytic splenic hypodense masses. There is 3.8 cm cyst to LLQ   US with no evidence of torsion. 4 cm cyst present. May be hemorrhagic. Will refer pt to [facility] for further evaluation management as well as to primary care for further evaluation of her abnormal CT findings. Will have pt return to ER for worsening symptoms or as needed                        Clinical Impression:   Final diagnoses:  [R10.32] LLQ pain  [N83.209] Hemorrhagic cyst of ovary (Primary)        ED Disposition Condition    Discharge Stable          ED Prescriptions       Medication Sig Dispense Start Date End Date Auth. Provider    ibuprofen (ADVIL,MOTRIN) 600 MG tablet Take 1 tablet (600 mg total) by mouth every 6 (six) hours as needed for Pain. 20 tablet 9/12/2022 9/17/2022 Lisa Hermosillo PA-C    acetaminophen (TYLENOL) 500 MG tablet Take 1 tablet (500 mg total) by mouth every 4 (four)  hours as needed. 20 tablet 9/12/2022 9/17/2022 Lisa Hermosillo PA-C    cyclobenzaprine (FLEXERIL) 10 MG tablet Take 1 tablet (10 mg total) by mouth 3 (three) times daily as needed. 20 tablet 9/12/2022 9/19/2022 Lisa Hermosillo PA-C          Follow-up Information       Follow up With Specialties Details Why Contact Info    Marilu Shaw MD Internal Medicine Schedule an appointment as soon as possible for a visit in 2 days for follow up [address] [place] [address]  [address]  [facility] [address]  [phone]      [facility] Emergency Dept Emergency Medicine Go to  As needed, If symptoms worsen [address] [facility] [id number] [phone]             Lisa Hermosillo PA-C  09/12/22 1916

## 2022-09-14 LAB
C TRACH DNA SPEC QL NAA+PROBE: NOT DETECTED
N GONORRHOEA DNA SPEC QL NAA+PROBE: NOT DETECTED

## 2023-10-29 ENCOUNTER — HOSPITAL ENCOUNTER (EMERGENCY)
Facility: HOSPITAL | Age: 48
Discharge: HOME | End: 2023-10-29
Attending: STUDENT IN AN ORGANIZED HEALTH CARE EDUCATION/TRAINING PROGRAM
Payer: MEDICAID

## 2023-10-29 VITALS
HEIGHT: 65 IN | WEIGHT: 170 LBS | OXYGEN SATURATION: 98 % | HEART RATE: 84 BPM | BODY MASS INDEX: 28.32 KG/M2 | DIASTOLIC BLOOD PRESSURE: 55 MMHG | SYSTOLIC BLOOD PRESSURE: 114 MMHG | TEMPERATURE: 98 F | RESPIRATION RATE: 16 BRPM

## 2023-10-29 DIAGNOSIS — R07.9 CHEST PAIN: Primary | ICD-10-CM

## 2023-10-29 LAB
ALBUMIN SERPL BCP-MCNC: 3.5 G/DL (ref 3.5–5.2)
ALP SERPL-CCNC: 62 U/L (ref 55–135)
ALT SERPL W/O P-5'-P-CCNC: 10 U/L (ref 10–44)
ANION GAP SERPL CALC-SCNC: 10 MMOL/L (ref 8–16)
AST SERPL-CCNC: 12 U/L (ref 10–40)
BASOPHILS # BLD AUTO: 0.02 K/UL (ref 0–0.2)
BASOPHILS NFR BLD: 0.3 % (ref 0–1.9)
BILIRUB SERPL-MCNC: 0.2 MG/DL (ref 0.1–1)
BNP SERPL-MCNC: 26 PG/ML (ref 0–99)
BUN SERPL-MCNC: 20 MG/DL (ref 6–20)
CALCIUM SERPL-MCNC: 8.9 MG/DL (ref 8.7–10.5)
CHLORIDE SERPL-SCNC: 108 MMOL/L (ref 95–110)
CO2 SERPL-SCNC: 23 MMOL/L (ref 23–29)
CREAT SERPL-MCNC: 1 MG/DL (ref 0.5–1.4)
DIFFERENTIAL METHOD: NORMAL
EOSINOPHIL # BLD AUTO: 0.1 K/UL (ref 0–0.5)
EOSINOPHIL NFR BLD: 1.5 % (ref 0–8)
ERYTHROCYTE [DISTWIDTH] IN BLOOD BY AUTOMATED COUNT: 13.3 % (ref 11.5–14.5)
EST. GFR  (NO RACE VARIABLE): >60 ML/MIN/1.73 M^2
GLUCOSE SERPL-MCNC: 123 MG/DL (ref 70–110)
HCT VFR BLD AUTO: 39.4 % (ref 37–48.5)
HGB BLD-MCNC: 12.6 G/DL (ref 12–16)
IMM GRANULOCYTES # BLD AUTO: 0.01 K/UL (ref 0–0.04)
IMM GRANULOCYTES NFR BLD AUTO: 0.1 % (ref 0–0.5)
LIPASE SERPL-CCNC: 79 U/L (ref 4–60)
LYMPHOCYTES # BLD AUTO: 2.3 K/UL (ref 1–4.8)
LYMPHOCYTES NFR BLD: 31.1 % (ref 18–48)
MAGNESIUM SERPL-MCNC: 1.6 MG/DL (ref 1.6–2.6)
MCH RBC QN AUTO: 27.1 PG (ref 27–31)
MCHC RBC AUTO-ENTMCNC: 32 G/DL (ref 32–36)
MCV RBC AUTO: 85 FL (ref 82–98)
MONOCYTES # BLD AUTO: 0.4 K/UL (ref 0.3–1)
MONOCYTES NFR BLD: 4.8 % (ref 4–15)
NEUTROPHILS # BLD AUTO: 4.7 K/UL (ref 1.8–7.7)
NEUTROPHILS NFR BLD: 62.2 % (ref 38–73)
NRBC BLD-RTO: 0 /100 WBC
PLATELET # BLD AUTO: 219 K/UL (ref 150–450)
PMV BLD AUTO: 10.3 FL (ref 9.2–12.9)
POTASSIUM SERPL-SCNC: 3.7 MMOL/L (ref 3.5–5.1)
PROT SERPL-MCNC: 6.6 G/DL (ref 6–8.4)
RBC # BLD AUTO: 4.65 M/UL (ref 4–5.4)
SODIUM SERPL-SCNC: 141 MMOL/L (ref 136–145)
TROPONIN I SERPL DL<=0.01 NG/ML-MCNC: 0.01 NG/ML (ref 0–0.03)
WBC # BLD AUTO: 7.49 K/UL (ref 3.9–12.7)

## 2023-10-29 PROCEDURE — 83735 ASSAY OF MAGNESIUM: CPT | Performed by: PHYSICIAN ASSISTANT

## 2023-10-29 PROCEDURE — 85025 COMPLETE CBC W/AUTO DIFF WBC: CPT | Performed by: PHYSICIAN ASSISTANT

## 2023-10-29 PROCEDURE — 99285 EMERGENCY DEPT VISIT HI MDM: CPT | Mod: 25

## 2023-10-29 PROCEDURE — 84484 ASSAY OF TROPONIN QUANT: CPT | Performed by: PHYSICIAN ASSISTANT

## 2023-10-29 PROCEDURE — 25000003 PHARM REV CODE 250: Performed by: PHYSICIAN ASSISTANT

## 2023-10-29 PROCEDURE — 80053 COMPREHEN METABOLIC PANEL: CPT | Performed by: PHYSICIAN ASSISTANT

## 2023-10-29 PROCEDURE — 93010 ELECTROCARDIOGRAM REPORT: CPT | Mod: ,,, | Performed by: INTERNAL MEDICINE

## 2023-10-29 PROCEDURE — 83880 ASSAY OF NATRIURETIC PEPTIDE: CPT | Performed by: PHYSICIAN ASSISTANT

## 2023-10-29 PROCEDURE — 93010 EKG 12-LEAD: ICD-10-PCS | Mod: ,,, | Performed by: INTERNAL MEDICINE

## 2023-10-29 PROCEDURE — 83690 ASSAY OF LIPASE: CPT | Performed by: PHYSICIAN ASSISTANT

## 2023-10-29 PROCEDURE — 93005 ELECTROCARDIOGRAM TRACING: CPT

## 2023-10-29 RX ORDER — ASPIRIN 325 MG
325 TABLET ORAL
Status: COMPLETED | OUTPATIENT
Start: 2023-10-29 | End: 2023-10-29

## 2023-10-29 RX ORDER — ACETAMINOPHEN 500 MG
1000 TABLET ORAL
Status: COMPLETED | OUTPATIENT
Start: 2023-10-29 | End: 2023-10-29

## 2023-10-29 RX ORDER — FAMOTIDINE 20 MG/1
20 TABLET, FILM COATED ORAL 2 TIMES DAILY
Qty: 28 TABLET | Refills: 0 | Status: SHIPPED | OUTPATIENT
Start: 2023-10-29 | End: 2023-11-12

## 2023-10-29 RX ADMIN — ASPIRIN 325 MG ORAL TABLET 325 MG: 325 PILL ORAL at 09:10

## 2023-10-29 RX ADMIN — ACETAMINOPHEN 1000 MG: 500 TABLET ORAL at 11:10

## 2023-10-30 NOTE — ED NOTES
"Pt c/o mid sternal chest "pressure" that started when pt woke up. Pain does not reproduce or radiate. Pt denies recent trauma or illness. Pt also deneis SOB, weakness, ABD pain, dizzinedd, H/A and any other complaints. Pain is 9/10.     Review of patient's allergies indicates:  No Known Allergies     Patient has verified the spelling of their name and  on armband.   APPEARANCE: Patient is alert, calm, oriented x 4, and does not appear distressed.  SKIN: Skin is normal for race, warm, and dry. Normal skin turgor and mucous membranes moist.  CARDIAC: Normal rate and rhythm, no murmur heard.   RESPIRATORY:Normal rate and effort. Breath sounds clear bilaterally throughout chest. Respirations are equal and unlabored.    GASTRO: Bowel sounds normal, abdomen is soft, no tenderness, and no abdominal distention.  MUSCLE: Full ROM. No bony tenderness or soft tissue tenderness. No obvious deformity.  PERIPHERAL VASCULAR: peripheral pulses present. Normal cap refill. No edema. Warm to touch.  NEURO: 5/5 strength major flexors/extensors bilaterally. Sensory intact to light touch bilaterally. Christine coma scale: eyes open spontaneously-4, oriented & converses-5, obeys commands-6. No neurological abnormalities.   MENTAL STATUS: awake, alert and aware of environment.  : Voids without complication    ED orders in progress. Pt SR up x 2. Bed in lowest position with wheels locked. Call bell within reach of pt.       "

## 2023-10-30 NOTE — DISCHARGE INSTRUCTIONS
We will try Pepcid twice daily, Tylenol as needed for discomfort to see if any improvement in your chest pain.    Please contact your primary care provider for follow-up and re-evaluation.    Return to this ED if you experience worsening chest pain, if you develop shortness of breath, if you feel lightheaded or feel as if you are going to pass out, if any other problems occur.

## 2023-10-30 NOTE — ED PROVIDER NOTES
Encounter Date: 10/29/2023       History     Chief Complaint   Patient presents with    Chest Pain     To the middle of her chest since this am, denies SOB, N/V/D, cough or congestion     47-year-old female presents to ED complaining of left-sided chest discomfort since this morning.    Patient admits to constant pain throughout the day, described as a pressure, nonradiating. Pt woke with CP this AM. No recent illness. No cough. No fever, chills, mylagias. She states has had similar CP in the past, most recently a few months ago--was never seen or evaluated for the chest pain.  She denies associated shortness of breath, diaphoresis, nausea vomiting, syncope or near-syncope, palpitations.    Denies personal history of ACS.  Diagnosed with hypertension but does not take her Rx medication, states has not taken in months.  No hyperlipidemia, no DM, nonsmoker.  Denies known family history of premature cardiac disease. Denies relief with Tums taken pta. Denies hx GERD.  No change in appetite or intake.  Denies associated abdominal pain.  No prandial or postprandial pain.  No worsening of pain with deep inspiration.  No rash.  No trauma.    No exogenous estrogen.  No history of VTE.  No unilateral leg swelling or calf pain.    PMH:  HTN      Review of patient's allergies indicates:  No Known Allergies  Past Medical History:   Diagnosis Date    Diabetes mellitus     gestational, diet controlled    Herpes     Hypertension      Past Surgical History:   Procedure Laterality Date     SECTION      x3    UMBILICAL HERNIA REPAIR       Family History   Problem Relation Age of Onset    Diabetes Other     Hypertension Other     Breast cancer Maternal Aunt     Breast cancer Paternal Aunt     Breast cancer Paternal Aunt     Colon cancer Neg Hx     Ovarian cancer Neg Hx      Social History     Tobacco Use    Smoking status: Never    Smokeless tobacco: Never   Substance Use Topics    Alcohol use: No     Comment: occ    Drug use:  No     Review of Systems   Constitutional:  Negative for appetite change, chills, diaphoresis and fever.   Respiratory:  Negative for cough and shortness of breath.    Cardiovascular:  Positive for chest pain. Negative for palpitations and leg swelling.   Gastrointestinal:  Negative for abdominal pain, nausea and vomiting.   Musculoskeletal:  Negative for back pain, myalgias, neck pain and neck stiffness.   Skin:  Negative for rash.   Neurological:  Negative for syncope.       Physical Exam     Initial Vitals [10/29/23 2131]   BP Pulse Resp Temp SpO2   131/65 85 16 98.2 °F (36.8 °C) 100 %      MAP       --         Physical Exam    Nursing note and vitals reviewed.  Constitutional: She appears well-developed and well-nourished. She is not diaphoretic. No distress.   Well-appearing, nontoxic, resting supine in bed.   HENT:   Head: Normocephalic and atraumatic.   Neck: Neck supple.   Normal range of motion.  Cardiovascular:  Intact distal pulses.           2+ DP bilaterally.  No unilateral leg swelling or calf tenderness.  No pretibial edema.  Normal sinus rhythm.   Pulmonary/Chest: Breath sounds normal. No respiratory distress.   There is very mild tenderness just to the left of the lower sternum (patient states she has been rubbing the area all day and now the areas a bit tender).   Abdominal: There is no abdominal tenderness.   Musculoskeletal:         General: No tenderness. Normal range of motion.      Cervical back: Normal range of motion and neck supple.     Neurological: She is alert and oriented to person, place, and time. GCS score is 15. GCS eye subscore is 4. GCS verbal subscore is 5. GCS motor subscore is 6.   Skin: Skin is warm.   Psychiatric: She has a normal mood and affect. Thought content normal.         ED Course   Procedures  Labs Reviewed   COMPREHENSIVE METABOLIC PANEL - Abnormal; Notable for the following components:       Result Value    Glucose 123 (*)     All other components within normal  limits   LIPASE - Abnormal; Notable for the following components:    Lipase 79 (*)     All other components within normal limits   CBC W/ AUTO DIFFERENTIAL   TROPONIN I   B-TYPE NATRIURETIC PEPTIDE   MAGNESIUM     EKG Readings: (Independently Interpreted)   Normal sinus rhythm, ventricular rate 85 beats per minute.  Normal MN, normal QT, normal QRS duration.  No right axis deviation.  No ST elevation.  Questionable incomplete right bundle-branch block pattern.  No gross change compared to previous dated 05/13/2020.       Imaging Results              X-Ray Chest AP Portable (Final result)  Result time 10/29/23 22:23:05      Final result by Maria C Pryor MD (10/29/23 22:23:05)                   Impression:      No acute intrathoracic abnormality detected.      Electronically signed by: Maria C Pryor  Date:    10/29/2023  Time:    22:23               Narrative:    EXAMINATION:  AP PORTABLE CHEST    CLINICAL HISTORY:  Chest Pain;    TECHNIQUE:  AP portable chest radiograph was submitted.    COMPARISON:  05/13/2020    FINDINGS:  AP portable chest radiograph demonstrates a cardiac silhouette within normal limits.  There is no focal consolidation, pneumothorax, or pleural effusion.                                    X-Rays:   Independently Interpreted Readings:   Chest X-Ray: Personal interpretation:  No cardiomegaly, no convincing effusion, no dense consolidation, no obvious pneumothorax.     Medications   aspirin tablet 325 mg (325 mg Oral Given 10/29/23 2158)   acetaminophen tablet 1,000 mg (1,000 mg Oral Given 10/29/23 2300)     Medical Decision Making  Differential diagnosis:  GERD, gastritis, costochondritis, pneumothorax, pleural effusion, pericarditis, myocarditis, ACS, PE, pancreatitis, anxiety    Amount and/or Complexity of Data Reviewed  External Data Reviewed: labs, radiology, ECG and notes.  Labs: ordered. Decision-making details documented in ED Course.  Radiology: ordered and independent  interpretation performed. Decision-making details documented in ED Course.  ECG/medicine tests: ordered and independent interpretation performed. Decision-making details documented in ED Course.  Discussion of management or test interpretation with external provider(s): Reassuring workup. Low HEART score. PERC negative.  Reassuring vitals.  No convincing evidence of emergent process.  Unsure culprit of chest pain, will treat with Tylenol, Pepcid to see if any GI involvement of her current complaints.  Strongly advised contacting her primary care provider for follow-up and re-evaluation of any persistent symptoms.  Discussed red flags and reasons for return.  She is comfortable plan.    Risk  Prescription drug management.      Additional MDM:   PERC Rule:   Age is greater than or equal to 50 = 0.0  Heart Rate is greater than or equal to 100 = 0.0  SaO2 on room air < 95% = 0.0  Unilateral leg swelling = 0.0  Hemoptysis = 0.0  Recent surgery or trauma = 0.0  Prior PE or DVT =  0.0  Hormone use = 0.00  PERC Score = 0      Heart Score:    History:          Slightly suspicious.  ECG:             Nonspecific repolarisation disturbance  Age:               45-65 years  Risk factors: 1-2 risk factors  Troponin:       Less than or equal to normal limit  Heart Score = 3                ED Course as of 10/30/23 0116   Sun Oct 29, 2023   2135 EKG 12-lead  EKG independently interpreted by me shows normal sinus rhythm, rate 85, no STEMI, overall unremarkable [BD]      ED Course User Index  [BD] Jean Zhu MD                    Clinical Impression:   Final diagnoses:  [R07.9] Chest pain (Primary)        ED Disposition Condition    Discharge Stable          ED Prescriptions       Medication Sig Dispense Start Date End Date Auth. Provider    famotidine (PEPCID) 20 MG tablet Take 1 tablet (20 mg total) by mouth 2 (two) times daily. for 14 days 28 tablet 10/29/2023 11/12/2023 Mulugeta Chauhan PA-C          Follow-up Information        Follow up With Specialties Details Why Contact Info    Rosetta Osborne, NP  Schedule an appointment as soon as possible for a visit  For reevaluation [address] [facility].    [address]    [place] [address]    [phone] (Work)    [phone] (Fax)             Mulugeta Chauhan, DENISSE  10/30/23 0116

## 2025-05-07 ENCOUNTER — HOSPITAL ENCOUNTER (EMERGENCY)
Facility: HOSPITAL | Age: 50
Discharge: HOME | End: 2025-05-07
Attending: STUDENT IN AN ORGANIZED HEALTH CARE EDUCATION/TRAINING PROGRAM
Payer: MEDICAID

## 2025-05-07 VITALS
BODY MASS INDEX: 26.82 KG/M2 | TEMPERATURE: 98 F | HEART RATE: 85 BPM | WEIGHT: 161 LBS | OXYGEN SATURATION: 100 % | HEIGHT: 65 IN | DIASTOLIC BLOOD PRESSURE: 60 MMHG | RESPIRATION RATE: 18 BRPM | SYSTOLIC BLOOD PRESSURE: 115 MMHG

## 2025-05-07 DIAGNOSIS — N64.4 BREAST PAIN, LEFT: Primary | ICD-10-CM

## 2025-05-07 PROCEDURE — 96372 THER/PROPH/DIAG INJ SC/IM: CPT | Performed by: STUDENT IN AN ORGANIZED HEALTH CARE EDUCATION/TRAINING PROGRAM

## 2025-05-07 PROCEDURE — 99284 EMERGENCY DEPT VISIT MOD MDM: CPT | Mod: 25

## 2025-05-07 PROCEDURE — 63600175 PHARM REV CODE 636 W HCPCS: Mod: JZ,TB | Performed by: STUDENT IN AN ORGANIZED HEALTH CARE EDUCATION/TRAINING PROGRAM

## 2025-05-07 RX ORDER — KETOROLAC TROMETHAMINE 30 MG/ML
30 INJECTION, SOLUTION INTRAMUSCULAR; INTRAVENOUS
Status: COMPLETED | OUTPATIENT
Start: 2025-05-07 | End: 2025-05-07

## 2025-05-07 RX ORDER — NAPROXEN 500 MG/1
500 TABLET ORAL 2 TIMES DAILY WITH MEALS
Qty: 20 TABLET | Refills: 0 | Status: SHIPPED | OUTPATIENT
Start: 2025-05-07 | End: 2025-05-17

## 2025-05-07 RX ADMIN — KETOROLAC TROMETHAMINE 30 MG: 30 INJECTION, SOLUTION INTRAMUSCULAR; INTRAVENOUS at 11:05

## 2025-05-08 NOTE — ED PROVIDER NOTES
"Encounter Date: 2025       History     Chief Complaint   Patient presents with    Breast Pain     Pt c/o 8/10 intermittent "sharp" L breast pain x3 days. Reports pain is isolated to the breast. States,  "not really chest pain but more so INSIDE my breast". Denies any recent trauma to area, discoloration, drainage, fevers, or chills. Denies any past  medical hx.      49 y.o. female with history below presents for evaluation of left breast pain.  Patient with significant family history concerning for malignancy.  Had normal mammogram 2 months ago.  Occasionally gets sharp/stabbing pains in her left breast specifically.  Over the last 3 days has been more frequent.  No nipple drainage or retraction.  No fevers.  Status post hysterectomy 7 years ago  The patient otherwise denies Chest Pain, Shortness of Breath, Abdominal Pain, N/V/D/Constipation, Eye complaints or Visual changes, Ear complaints, Neck or Back Pain, and Myalgias    Triage vitals were reviewed and are: Initial Vitals [25 2131]  BP: 127/60  Pulse: 82  Resp: 20  Temp: 98.4 °F (36.9 °C)  SpO2: 100 %  MAP: n/a    The Patient:   has a past medical history of Diabetes mellitus, Herpes, and Hypertension.   has a past surgical history that includes  section and Umbilical hernia repair.   reports that she has never smoked. She has never used smokeless tobacco. She reports that she does not drink alcohol and does not use drugs.  Has allergies listed as: Patient has no known allergies.        The history is provided by the patient. No  was used.     Review of patient's allergies indicates:  No Known Allergies  Past Medical History:   Diagnosis Date    Diabetes mellitus     gestational, diet controlled    Herpes     Hypertension      Past Surgical History:   Procedure Laterality Date     SECTION      x3    UMBILICAL HERNIA REPAIR       Family History   Problem Relation Name Age of Onset    Diabetes Other      Hypertension " Other      Breast cancer Maternal Aunt      Breast cancer Paternal Aunt      Breast cancer Paternal Aunt      Colon cancer Neg Hx      Ovarian cancer Neg Hx       Social History[1]  Review of Systems   All other systems reviewed and are negative.      Physical Exam     Initial Vitals [05/07/25 2131]   BP Pulse Resp Temp SpO2   127/60 82 20 98.4 °F (36.9 °C) 100 %      MAP       --         Physical Exam    Nursing note and vitals reviewed.  Constitutional: She appears well-developed and well-nourished.   Body mass index is 26.79 kg/m².   HENT:   Head: Normocephalic and atraumatic.   Eyes: EOM are normal. Pupils are equal, round, and reactive to light.   Neck: Neck supple.   Cardiovascular:  Normal rate, regular rhythm and intact distal pulses.           Pulmonary/Chest: No respiratory distress.   Genitourinary:    Genitourinary Comments: Breast exam performed with nurse chaperone in plain view:  Bilateral breasts are pendulous, symmetric.  Focused exam of the left breast demonstrates fibrous tissue without significant localized mass.  There is no overlying skin change.  There is no nipple change.  There is no discharge.  No axillary lymphadenopathy.     Musculoskeletal:      Cervical back: Neck supple.     Neurological: She is alert and oriented to person, place, and time. GCS score is 15. GCS eye subscore is 4. GCS verbal subscore is 5. GCS motor subscore is 6.   Skin: Skin is warm and dry. Capillary refill takes less than 2 seconds.   Psychiatric: She has a normal mood and affect. Her behavior is normal.         ED Course   Procedures  Labs Reviewed - No data to display       Imaging Results    None          Medications   ketorolac injection 30 mg (30 mg Intramuscular Given 5/7/25 6931)     Medical Decision Making  Encounter Date: 5/7/2025  --------------------------------------------------------------------------  49 y.o. female presents for evaluation of left breast pain.  Hemodynamically stable. Afebrile.  "Phonating and protecting the airway spontaneously. No clinical evidence for cardiovascular instability or impending airway compromise.  Remainder of physical exam as above.    Additional or Independent Historians available and contributing to the history as above: NONE  Prior medical records, when available, were reviewed. This includes a review of the patients comorbidities, medications, and recent hospital or outpatient notes.   Comorbid Conditions affecting evaluation, treatment or discharge planning: NONE IDENTIFIED   Social Determinates of Health identified and considered in the evaluation and treatment of this patient: None Identified or significantly impacting evaluation and treatment    Differential diagnoses includes but is not limited to:   Cellulitis, malignancy, mastitis, abscess, fibrocystic breast change  --------------------------------------------------------------------------  All available clinical tests were reviewed by me and documented in the ED course.  Vitals range:   Temp:  [98 °F (36.7 °C)-98.4 °F (36.9 °C)] 98 °F (36.7 °C)  Pulse:  [82-85] 85  Resp:  [18-20] 18  SpO2:  [100 %] 100 %  BP: (115-127)/(60) 115/60  Estimated body mass index is 26.79 kg/m² as calculated from the following:    Height as of this encounter: 5' 5" (1.651 m).    Weight as of this encounter: 73 kg (161 lb).    ED MEDS GIVEN:  Medications  ketorolac injection 30 mg (30 mg Intramuscular Given 5/7/25 1165)    Procedures Performed: None  --------------------------------------------------------------------------  Problem #1:  Breast pain  Patient presents emergency room today for evaluation of left breast pain.  Similar to prior but were frequent in the last few days.  Overall nontoxic and well-appearing.  Breast exam was largely unremarkable.  I have low suspicion for occult malignancy given normal mammogram 2 months ago.  There is no evidence of infectious etiology.  Will start on anti-inflammatory medication and refer " back to breast doctor.    I see no indication of an emergent process beyond that addressed during our encounter but have duly counseled the patient/family regarding the need for prompt follow-up as well as the indications that should prompt immediate return to the emergency room should new or worrisome developments occur.  The patient/family has been provided with verbal and printed direction regarding our final diagnosis(es) as well as instructions regarding use of OTC and/or Rx medications intended to manage the patient's conditions.   The patient/family communicates understanding of all this information and all remaining questions and concerns were addressed at this time.  DISCLAIMER: This note was prepared with [employer] voice recognition transcription software. Garbled syntax, mangled pronouns, and other bizarre constructions may be attributed to that software system.    Final diagnoses:  [N64.4] Breast pain, left (Primary)                  Amount and/or Complexity of Data Reviewed  Radiology:  Decision-making details documented in ED Course.    Risk  Prescription drug management.               ED Course as of 05/08/25 0127   Wed May 07, 2025   2257 BP: 127/60 [AN]   2257 Temp: 98.4 °F (36.9 °C) [AN]   2257 Pulse: 82 [AN]   2257 Resp: 20 [AN]   2257 SpO2: 100 % [AN]   2258 Mammo Digital Screening Bilat w/ Fco (XPD) (3/19/25 1129)  Breast density: The breasts are heterogeneously dense, which may obscure small masses.     There are stable benign-appearing masses in both breasts.   There are no suspicious masses, calcifications, or areas of architectural distortion.  There is no detrimental interval change.  [AN]      ED Course User Index  [AN] Oskar Suarez, DENISSE                           Clinical Impression:  Final diagnoses:  [N64.4] Breast pain, left (Primary)          ED Disposition Condition    Discharge Stable          ED Prescriptions       Medication Sig Dispense Start Date End Date Auth. Provider     naproxen (NAPROSYN) 500 MG tablet Take 1 tablet (500 mg total) by mouth 2 (two) times daily with meals. for 10 days 20 tablet 5/7/2025 5/17/2025 Oskar Suarez PA-C          Follow-up Information       Follow up With Specialties Details Why Contact Info    Rosetta Osborne, FNP-C Family Medicine Schedule an appointment as soon as possible for a visit in 3 days  [address]  [facility] [address]  [phone]      [facility] Emergency Medicine Go to  As needed, If symptoms worsen [address] [facility] [place] [address] [phone]               [1]   Social History  Tobacco Use    Smoking status: Never    Smokeless tobacco: Never   Substance Use Topics    Alcohol use: No     Comment: occ    Drug use: No        Oskar Suarez PA-C  05/08/25 0127

## 2025-05-08 NOTE — DISCHARGE INSTRUCTIONS
Problem Specific Instructions:  Take medication as prescribed.  Follow up with your primary care provider and your breast doctor.  Return emergency room for any new or worsening symptoms.     If you received or are discharged with pain medicine or muscle relaxers, understand that they can make you sleepy or impair your judgement. Do not make important decisions, drink, drive, swim or perform any other tasks you would not otherwise perform while impaired for at least 24 hours after your last dose.      Ensure you follow up with your Primary Care Provider or any additional providers listed on this discharge sheet. While you may be healthy enough to go home today, I cannot predict the exact course of your diagnoses. It is important to remember that some problems are difficult to diagnose and may not be found during your first visit. As such, it is your responsibility to monitor symptoms, follow-up with another healthcare provider, or return to the emergency room for new or worsening concerns. Unless otherwise instructed, continue all home medications and any new medications prescribed to you in the Emergency Department.